# Patient Record
Sex: FEMALE | Race: OTHER | HISPANIC OR LATINO
[De-identification: names, ages, dates, MRNs, and addresses within clinical notes are randomized per-mention and may not be internally consistent; named-entity substitution may affect disease eponyms.]

---

## 2017-01-28 ENCOUNTER — LABORATORY RESULT (OUTPATIENT)
Age: 72
End: 2017-01-28

## 2017-02-24 ENCOUNTER — APPOINTMENT (OUTPATIENT)
Dept: PULMONOLOGY | Facility: CLINIC | Age: 72
End: 2017-02-24

## 2017-02-24 VITALS
WEIGHT: 126 LBS | BODY MASS INDEX: 24.61 KG/M2 | RESPIRATION RATE: 14 BRPM | DIASTOLIC BLOOD PRESSURE: 80 MMHG | SYSTOLIC BLOOD PRESSURE: 134 MMHG | HEART RATE: 82 BPM | OXYGEN SATURATION: 97 %

## 2017-02-24 DIAGNOSIS — R76.11 NONSPECIFIC REACTION TO TUBERCULIN SKIN TEST W/OUT ACTIVE TUBERCULOSIS: ICD-10-CM

## 2018-07-05 ENCOUNTER — APPOINTMENT (OUTPATIENT)
Dept: GASTROENTEROLOGY | Facility: CLINIC | Age: 73
End: 2018-07-05
Payer: MEDICARE

## 2018-07-05 VITALS
BODY MASS INDEX: 24.74 KG/M2 | HEIGHT: 60 IN | WEIGHT: 126 LBS | SYSTOLIC BLOOD PRESSURE: 130 MMHG | HEART RATE: 76 BPM | DIASTOLIC BLOOD PRESSURE: 72 MMHG | OXYGEN SATURATION: 97 %

## 2018-07-05 DIAGNOSIS — R10.13 EPIGASTRIC PAIN: ICD-10-CM

## 2018-07-05 PROCEDURE — 99204 OFFICE O/P NEW MOD 45 MIN: CPT

## 2018-09-25 ENCOUNTER — APPOINTMENT (OUTPATIENT)
Dept: GASTROENTEROLOGY | Facility: GI CENTER | Age: 73
End: 2018-09-25
Payer: MEDICARE

## 2018-09-25 ENCOUNTER — RESULT REVIEW (OUTPATIENT)
Age: 73
End: 2018-09-25

## 2018-09-25 ENCOUNTER — OUTPATIENT (OUTPATIENT)
Dept: OUTPATIENT SERVICES | Facility: HOSPITAL | Age: 73
LOS: 1 days | End: 2018-09-25
Payer: MEDICARE

## 2018-09-25 VITALS
SYSTOLIC BLOOD PRESSURE: 130 MMHG | HEIGHT: 60 IN | TEMPERATURE: 98 F | RESPIRATION RATE: 12 BRPM | HEART RATE: 80 BPM | DIASTOLIC BLOOD PRESSURE: 70 MMHG | WEIGHT: 126 LBS | BODY MASS INDEX: 24.74 KG/M2

## 2018-09-25 DIAGNOSIS — R10.13 EPIGASTRIC PAIN: ICD-10-CM

## 2018-09-25 DIAGNOSIS — K21.9 GASTRO-ESOPHAGEAL REFLUX DISEASE WITHOUT ESOPHAGITIS: ICD-10-CM

## 2018-09-25 LAB — GLUCOSE BLDC GLUCOMTR-MCNC: 156 MG/DL — HIGH (ref 70–99)

## 2018-09-25 PROCEDURE — T1013: CPT

## 2018-09-25 PROCEDURE — 43239 EGD BIOPSY SINGLE/MULTIPLE: CPT

## 2018-09-25 PROCEDURE — 88342 IMHCHEM/IMCYTCHM 1ST ANTB: CPT

## 2018-09-25 PROCEDURE — 88342 IMHCHEM/IMCYTCHM 1ST ANTB: CPT | Mod: 26

## 2018-09-25 PROCEDURE — 82962 GLUCOSE BLOOD TEST: CPT

## 2018-09-25 PROCEDURE — 88305 TISSUE EXAM BY PATHOLOGIST: CPT | Mod: 26

## 2018-09-25 PROCEDURE — 88305 TISSUE EXAM BY PATHOLOGIST: CPT

## 2018-09-28 LAB — SURGICAL PATHOLOGY FINAL REPORT - CH: SIGNIFICANT CHANGE UP

## 2018-10-25 ENCOUNTER — OUTPATIENT (OUTPATIENT)
Dept: OUTPATIENT SERVICES | Facility: HOSPITAL | Age: 73
LOS: 1 days | End: 2018-10-25
Payer: MEDICARE

## 2018-10-25 ENCOUNTER — APPOINTMENT (OUTPATIENT)
Dept: RADIOLOGY | Facility: CLINIC | Age: 73
End: 2018-10-25
Payer: MEDICARE

## 2018-10-25 ENCOUNTER — APPOINTMENT (OUTPATIENT)
Dept: MAMMOGRAPHY | Facility: CLINIC | Age: 73
End: 2018-10-25
Payer: MEDICARE

## 2018-10-25 DIAGNOSIS — Z12.31 ENCOUNTER FOR SCREENING MAMMOGRAM FOR MALIGNANT NEOPLASM OF BREAST: ICD-10-CM

## 2018-10-25 PROCEDURE — 77080 DXA BONE DENSITY AXIAL: CPT

## 2018-10-25 PROCEDURE — 77063 BREAST TOMOSYNTHESIS BI: CPT

## 2018-10-25 PROCEDURE — 77063 BREAST TOMOSYNTHESIS BI: CPT | Mod: 26

## 2018-10-25 PROCEDURE — 77080 DXA BONE DENSITY AXIAL: CPT | Mod: 26

## 2018-10-25 PROCEDURE — 77067 SCR MAMMO BI INCL CAD: CPT | Mod: 26

## 2018-10-25 PROCEDURE — 77067 SCR MAMMO BI INCL CAD: CPT

## 2018-12-06 ENCOUNTER — APPOINTMENT (OUTPATIENT)
Dept: GASTROENTEROLOGY | Facility: CLINIC | Age: 73
End: 2018-12-06
Payer: MEDICARE

## 2018-12-06 VITALS
HEIGHT: 60 IN | DIASTOLIC BLOOD PRESSURE: 81 MMHG | WEIGHT: 126 LBS | HEART RATE: 80 BPM | SYSTOLIC BLOOD PRESSURE: 150 MMHG | BODY MASS INDEX: 24.74 KG/M2

## 2018-12-06 DIAGNOSIS — K21.9 GASTRO-ESOPHAGEAL REFLUX DISEASE W/OUT ESOPHAGITIS: ICD-10-CM

## 2018-12-06 DIAGNOSIS — K29.00 ACUTE GASTRITIS W/OUT BLEEDING: ICD-10-CM

## 2018-12-06 PROCEDURE — 99214 OFFICE O/P EST MOD 30 MIN: CPT

## 2019-12-01 ENCOUNTER — TRANSCRIPTION ENCOUNTER (OUTPATIENT)
Age: 74
End: 2019-12-01

## 2021-10-12 ENCOUNTER — APPOINTMENT (OUTPATIENT)
Dept: GASTROENTEROLOGY | Facility: CLINIC | Age: 76
End: 2021-10-12
Payer: MEDICARE

## 2021-10-12 VITALS
HEIGHT: 60 IN | BODY MASS INDEX: 24.35 KG/M2 | RESPIRATION RATE: 14 BRPM | DIASTOLIC BLOOD PRESSURE: 70 MMHG | TEMPERATURE: 97.5 F | WEIGHT: 124 LBS | OXYGEN SATURATION: 98 % | HEART RATE: 71 BPM | SYSTOLIC BLOOD PRESSURE: 152 MMHG

## 2021-10-12 PROCEDURE — 99214 OFFICE O/P EST MOD 30 MIN: CPT

## 2021-10-12 NOTE — HISTORY OF PRESENT ILLNESS
[None] : had no significant interval events [Heartburn] : stable heartburn [Nausea] : denies nausea [Vomiting] : denies vomiting [Diarrhea] : denies diarrhea [Constipation] : denies constipation [Yellow Skin Or Eyes (Jaundice)] : denies jaundice [Abdominal Pain] : denies abdominal pain [Abdominal Swelling] : denies abdominal swelling [Rectal Pain] : denies rectal pain [GERD] : gastroesophageal reflux disease [Abdominal Surgery] : abdominal surgery [Wt Gain ___ Lbs] : no recent weight gain [Wt Loss ___ Lbs] : no recent weight loss [Hiatus Hernia] : no hiatus hernia [Peptic Ulcer Disease] : no peptic ulcer disease [Pancreatitis] : no pancreatitis [Cholelithiasis] : no cholelithiasis [Kidney Stone] : no kidney stone [Inflammatory Bowel Disease] : no inflammatory bowel disease [Irritable Bowel Syndrome] : no irritable bowel syndrome [Diverticulitis] : no diverticulitis [Alcohol Abuse] : no alcohol abuse [Malignancy] : no malignancy [Appendectomy] : no appendectomy [Cholecystectomy] : no cholecystectomy [de-identified] : 2018 [de-identified] : EGD with biopsy [de-identified] : Patient presents for follow-up evaluation for positive fecal immunochemical test or FIT.  She apparently has not had a colonoscopy for more than 10 years and denies gross hematochezia or melena or abdominal pain or unexplained weight loss or anorexia.  She was last seen in September 2018 at which time she had an upper endoscopy with biopsy which showed mild H. pylori negative gastritis but was otherwise unremarkable and she presently has no upper GI symptoms or complaints.  Her chronic GERD is managed with dietary modifications.

## 2021-10-12 NOTE — PHYSICAL EXAM
[General Appearance - Alert] : alert [General Appearance - In No Acute Distress] : in no acute distress [General Appearance - Well Nourished] : well nourished [General Appearance - Well-Appearing] : healthy appearing [General Appearance - Well Developed] : well developed [Sclera] : the sclera and conjunctiva were normal [PERRL With Normal Accommodation] : pupils were equal in size, round, and reactive to light [Extraocular Movements] : extraocular movements were intact [Outer Ear] : the ears and nose were normal in appearance [Examination Of The Oral Cavity] : the lips and gums were normal [Oropharynx] : the oropharynx was normal [Neck Appearance] : the appearance of the neck was normal [Neck Cervical Mass (___cm)] : no neck mass was observed [Jugular Venous Distention Increased] : there was no jugular-venous distention [Thyroid Diffuse Enlargement] : the thyroid was not enlarged [Thyroid Nodule] : there were no palpable thyroid nodules [Auscultation Breath Sounds / Voice Sounds] : lungs were clear to auscultation bilaterally [Heart Rate And Rhythm] : heart rate was normal and rhythm regular [Heart Sounds] : normal S1 and S2 [Heart Sounds Gallop] : no gallops [Murmurs] : no murmurs [Heart Sounds Pericardial Friction Rub] : no pericardial rub [Bowel Sounds] : normal bowel sounds [] : no hepato-splenomegaly [Abdomen Soft] : soft [Abdomen Mass (___ Cm)] : no abdominal mass palpated [Epigastric] : in the epigastric area [Patient Refused] : rectal exam was refused by the patient [No CVA Tenderness] : no ~M costovertebral angle tenderness [Abnormal Walk] : normal gait [Musculoskeletal - Swelling] : no joint swelling seen [Skin Color & Pigmentation] : normal skin color and pigmentation [Skin Turgor] : normal skin turgor [Oriented To Time, Place, And Person] : oriented to person, place, and time [Periumbilical] : not in the periumbilical area [Suprapubic] : not in the suprapubic area [RUQ] : not in the in the right upper quadrant [RLQ] : not in the right lower quadrant [LUQ] : not in the left upper quadrant [LLQ] : not in the left lower quadrant

## 2021-10-12 NOTE — ASSESSMENT
[FreeTextEntry1] : Impression: Positive fecal immunochemical test or FIT rule out colorectal neoplasm.\par \par Recommendations: High risk screening colonoscopy was advised for further evaluation of the above.  The risk versus benefits of high risk screening colonoscopy and intravenous sedation, and alternative testing such as virtual colonoscopy, were individually explained to the patient and her daughter today who accompanied the patient and acted as an  for both of whom appeared to understand all of the above and were agreeable to proceeding with screening colonoscopy.  Her ASA classification is 2.  She will be prepped with MiraLAX and Dulcolax tablets and is medically optimized for the proposed colonoscopy and appeared to understand all of the above instructions, information, and management plan.

## 2021-12-25 ENCOUNTER — APPOINTMENT (OUTPATIENT)
Dept: DISASTER EMERGENCY | Facility: CLINIC | Age: 76
End: 2021-12-25

## 2021-12-27 ENCOUNTER — TRANSCRIPTION ENCOUNTER (OUTPATIENT)
Age: 76
End: 2021-12-27

## 2021-12-28 ENCOUNTER — RESULT REVIEW (OUTPATIENT)
Age: 76
End: 2021-12-28

## 2021-12-28 ENCOUNTER — OUTPATIENT (OUTPATIENT)
Dept: OUTPATIENT SERVICES | Facility: HOSPITAL | Age: 76
LOS: 1 days | End: 2021-12-28
Payer: MEDICARE

## 2021-12-28 ENCOUNTER — APPOINTMENT (OUTPATIENT)
Dept: GASTROENTEROLOGY | Facility: GI CENTER | Age: 76
End: 2021-12-28
Payer: MEDICARE

## 2021-12-28 DIAGNOSIS — R19.5 OTHER FECAL ABNORMALITIES: ICD-10-CM

## 2021-12-28 DIAGNOSIS — K57.30 DIVERTICULOSIS OF LARGE INTESTINE W/OUT PERFORATION OR ABSCESS W/OUT BLEEDING: ICD-10-CM

## 2021-12-28 DIAGNOSIS — D12.3 BENIGN NEOPLASM OF TRANSVERSE COLON: ICD-10-CM

## 2021-12-28 DIAGNOSIS — K64.8 OTHER HEMORRHOIDS: ICD-10-CM

## 2021-12-28 LAB — GLUCOSE BLDC GLUCOMTR-MCNC: 162 MG/DL — HIGH (ref 70–99)

## 2021-12-28 PROCEDURE — 88305 TISSUE EXAM BY PATHOLOGIST: CPT | Mod: 26

## 2021-12-28 PROCEDURE — 82962 GLUCOSE BLOOD TEST: CPT

## 2021-12-28 PROCEDURE — 45385 COLONOSCOPY W/LESION REMOVAL: CPT

## 2021-12-28 PROCEDURE — 45380 COLONOSCOPY AND BIOPSY: CPT

## 2021-12-28 PROCEDURE — 88305 TISSUE EXAM BY PATHOLOGIST: CPT

## 2021-12-31 LAB — SURGICAL PATHOLOGY STUDY: SIGNIFICANT CHANGE UP

## 2022-11-08 ENCOUNTER — OFFICE (OUTPATIENT)
Dept: URBAN - METROPOLITAN AREA CLINIC 94 | Facility: CLINIC | Age: 77
Setting detail: OPHTHALMOLOGY
End: 2022-11-08
Payer: MEDICAID

## 2022-11-08 DIAGNOSIS — E11.3291: ICD-10-CM

## 2022-11-08 DIAGNOSIS — E11.3293: ICD-10-CM

## 2022-11-08 DIAGNOSIS — H35.3111: ICD-10-CM

## 2022-11-08 DIAGNOSIS — H16.223: ICD-10-CM

## 2022-11-08 DIAGNOSIS — E11.3292: ICD-10-CM

## 2022-11-08 DIAGNOSIS — H40.1131: ICD-10-CM

## 2022-11-08 PROCEDURE — 92133 CPTRZD OPH DX IMG PST SGM ON: CPT | Performed by: OPHTHALMOLOGY

## 2022-11-08 PROCEDURE — 99213 OFFICE O/P EST LOW 20 MIN: CPT | Performed by: OPHTHALMOLOGY

## 2022-11-08 ASSESSMENT — REFRACTION_CURRENTRX
OD_CYLINDER: -0.50
OS_ADD: +2.75
OD_ADD: +2.75
OS_OVR_VA: 20/
OD_VPRISM_DIRECTION: PROGS
OS_SPHERE: -0.25
OS_AXIS: 067
OD_OVR_VA: 20/
OS_CYLINDER: -1.25
OD_SPHERE: -0.25
OD_AXIS: 098
OS_VPRISM_DIRECTION: PROGS

## 2022-11-08 ASSESSMENT — VISUAL ACUITY
OD_BCVA: 20/25
OS_BCVA: 20/30

## 2022-11-08 ASSESSMENT — REFRACTION_AUTOREFRACTION
OS_AXIS: 069
OS_CYLINDER: -2.25
OS_SPHERE: +1.00
OD_SPHERE: +1.00
OD_AXIS: 094
OD_CYLINDER: -1.50

## 2022-11-08 ASSESSMENT — KERATOMETRY
OS_AXISANGLE_DEGREES: 155
METHOD_AUTO_MANUAL: AUTO
OS_K2POWER_DIOPTERS: 45.25
OD_AXISANGLE_DEGREES: 010
OD_K1POWER_DIOPTERS: 44.00
OS_K1POWER_DIOPTERS: 43.50
OD_K2POWER_DIOPTERS: 44.25

## 2022-11-08 ASSESSMENT — PACHYMETRY
OD_CT_UM: 512
OS_CT_UM: 520
OS_CT_CORRECTION: 1
OD_CT_CORRECTION: 2

## 2022-11-08 ASSESSMENT — AXIALLENGTH_DERIVED
OD_AL: 23.2694
OS_AL: 23.3223

## 2022-11-08 ASSESSMENT — TONOMETRY
OS_IOP_MMHG: 18
OD_IOP_MMHG: 15
OS_IOP_MMHG: 15
OD_IOP_MMHG: 18

## 2022-11-08 ASSESSMENT — CONFRONTATIONAL VISUAL FIELD TEST (CVF)
OD_FINDINGS: FULL
OS_FINDINGS: FULL

## 2022-11-08 ASSESSMENT — LID EXAM ASSESSMENTS: OS_TRICHIASIS: LLL 1+

## 2022-11-08 ASSESSMENT — SPHEQUIV_DERIVED
OS_SPHEQUIV: -0.125
OD_SPHEQUIV: 0.25

## 2022-12-09 ENCOUNTER — APPOINTMENT (OUTPATIENT)
Dept: GASTROENTEROLOGY | Facility: CLINIC | Age: 77
End: 2022-12-09

## 2023-02-06 ENCOUNTER — OFFICE (OUTPATIENT)
Dept: URBAN - METROPOLITAN AREA CLINIC 94 | Facility: CLINIC | Age: 78
Setting detail: OPHTHALMOLOGY
End: 2023-02-06
Payer: MEDICAID

## 2023-02-06 DIAGNOSIS — H40.1131: ICD-10-CM

## 2023-02-06 DIAGNOSIS — E11.3291: ICD-10-CM

## 2023-02-06 DIAGNOSIS — E11.3293: ICD-10-CM

## 2023-02-06 DIAGNOSIS — H35.3111: ICD-10-CM

## 2023-02-06 DIAGNOSIS — H16.223: ICD-10-CM

## 2023-02-06 DIAGNOSIS — E11.3292: ICD-10-CM

## 2023-02-06 PROCEDURE — 99213 OFFICE O/P EST LOW 20 MIN: CPT | Performed by: OPHTHALMOLOGY

## 2023-02-06 PROCEDURE — 92083 EXTENDED VISUAL FIELD XM: CPT | Performed by: OPHTHALMOLOGY

## 2023-02-06 ASSESSMENT — KERATOMETRY
OD_K1POWER_DIOPTERS: 43.50
OD_K2POWER_DIOPTERS: 44.25
OS_K1POWER_DIOPTERS: 43.75
OS_K2POWER_DIOPTERS: 45.25
OD_AXISANGLE_DEGREES: 180
OS_AXISANGLE_DEGREES: 151
METHOD_AUTO_MANUAL: AUTO

## 2023-02-06 ASSESSMENT — REFRACTION_CURRENTRX
OD_OVR_VA: 20/
OD_CYLINDER: -0.50
OS_SPHERE: -0.25
OS_AXIS: 067
OS_VPRISM_DIRECTION: PROGS
OD_SPHERE: -0.25
OS_CYLINDER: -1.25
OS_OVR_VA: 20/
OD_VPRISM_DIRECTION: PROGS
OD_AXIS: 098
OS_ADD: +2.75
OD_ADD: +2.75

## 2023-02-06 ASSESSMENT — REFRACTION_AUTOREFRACTION
OS_SPHERE: +1.00
OS_CYLINDER: -2.50
OD_CYLINDER: -1.50
OS_AXIS: 069
OD_SPHERE: +1.00
OD_AXIS: 091

## 2023-02-06 ASSESSMENT — TONOMETRY
OD_IOP_MMHG: 18
OS_IOP_MMHG: 17
OD_IOP_MMHG: 18
OS_IOP_MMHG: 18

## 2023-02-06 ASSESSMENT — VISUAL ACUITY
OD_BCVA: 20/30
OS_BCVA: 20/25

## 2023-02-06 ASSESSMENT — SPHEQUIV_DERIVED
OD_SPHEQUIV: 0.25
OS_SPHEQUIV: -0.25

## 2023-02-06 ASSESSMENT — PACHYMETRY
OS_CT_UM: 520
OD_CT_CORRECTION: 2
OS_CT_CORRECTION: 1
OD_CT_UM: 512

## 2023-02-06 ASSESSMENT — LID EXAM ASSESSMENTS: OS_TRICHIASIS: LLL 1+

## 2023-02-06 ASSESSMENT — AXIALLENGTH_DERIVED
OS_AL: 23.325
OD_AL: 23.3591

## 2023-03-06 ENCOUNTER — OFFICE (OUTPATIENT)
Dept: URBAN - METROPOLITAN AREA CLINIC 94 | Facility: CLINIC | Age: 78
Setting detail: OPHTHALMOLOGY
End: 2023-03-06
Payer: MEDICAID

## 2023-03-06 DIAGNOSIS — E11.3293: ICD-10-CM

## 2023-03-06 DIAGNOSIS — H35.3111: ICD-10-CM

## 2023-03-06 PROCEDURE — 92235 FLUORESCEIN ANGRPH MLTIFRAME: CPT | Performed by: SPECIALIST

## 2023-03-06 PROCEDURE — 92012 INTRM OPH EXAM EST PATIENT: CPT | Performed by: SPECIALIST

## 2023-03-06 PROCEDURE — 92134 CPTRZ OPH DX IMG PST SGM RTA: CPT | Performed by: SPECIALIST

## 2023-03-06 ASSESSMENT — LID EXAM ASSESSMENTS: OS_TRICHIASIS: LLL 1+

## 2023-03-06 ASSESSMENT — SPHEQUIV_DERIVED
OS_SPHEQUIV: -0.25
OD_SPHEQUIV: 0.25

## 2023-03-06 ASSESSMENT — KERATOMETRY
METHOD_AUTO_MANUAL: AUTO
OS_AXISANGLE_DEGREES: 151
OD_K1POWER_DIOPTERS: 43.50
OD_AXISANGLE_DEGREES: 180
OS_K2POWER_DIOPTERS: 45.25
OD_K2POWER_DIOPTERS: 44.25
OS_K1POWER_DIOPTERS: 43.75

## 2023-03-06 ASSESSMENT — REFRACTION_AUTOREFRACTION
OS_AXIS: 069
OD_CYLINDER: -1.50
OS_CYLINDER: -2.50
OD_SPHERE: +1.00
OS_SPHERE: +1.00
OD_AXIS: 091

## 2023-03-06 ASSESSMENT — VISUAL ACUITY
OD_BCVA: 20/30
OS_BCVA: 20/25

## 2023-03-06 ASSESSMENT — AXIALLENGTH_DERIVED
OD_AL: 23.3591
OS_AL: 23.325

## 2023-05-28 ENCOUNTER — EMERGENCY (EMERGENCY)
Facility: HOSPITAL | Age: 78
LOS: 1 days | Discharge: HOME | End: 2023-05-28
Attending: EMERGENCY MEDICINE
Payer: MEDICARE

## 2023-05-28 VITALS
RESPIRATION RATE: 16 BRPM | HEART RATE: 71 BPM | OXYGEN SATURATION: 98 % | DIASTOLIC BLOOD PRESSURE: 63 MMHG | TEMPERATURE: 98 F | SYSTOLIC BLOOD PRESSURE: 106 MMHG

## 2023-05-28 VITALS
HEART RATE: 90 BPM | RESPIRATION RATE: 18 BRPM | TEMPERATURE: 98 F | OXYGEN SATURATION: 97 % | SYSTOLIC BLOOD PRESSURE: 133 MMHG | HEIGHT: 60 IN | DIASTOLIC BLOOD PRESSURE: 71 MMHG | WEIGHT: 126.1 LBS

## 2023-05-28 LAB
ALBUMIN SERPL ELPH-MCNC: 4.1 G/DL — SIGNIFICANT CHANGE UP (ref 3.3–5.2)
ALP SERPL-CCNC: 81 U/L — SIGNIFICANT CHANGE UP (ref 40–120)
ALT FLD-CCNC: 10 U/L — SIGNIFICANT CHANGE UP
ANION GAP SERPL CALC-SCNC: 13 MMOL/L — SIGNIFICANT CHANGE UP (ref 5–17)
AST SERPL-CCNC: 14 U/L — SIGNIFICANT CHANGE UP
BASOPHILS # BLD AUTO: 0.03 K/UL — SIGNIFICANT CHANGE UP (ref 0–0.2)
BASOPHILS NFR BLD AUTO: 0.2 % — SIGNIFICANT CHANGE UP (ref 0–2)
BILIRUB SERPL-MCNC: 0.4 MG/DL — SIGNIFICANT CHANGE UP (ref 0.4–2)
BUN SERPL-MCNC: 15.3 MG/DL — SIGNIFICANT CHANGE UP (ref 8–20)
CALCIUM SERPL-MCNC: 9.2 MG/DL — SIGNIFICANT CHANGE UP (ref 8.4–10.5)
CHLORIDE SERPL-SCNC: 99 MMOL/L — SIGNIFICANT CHANGE UP (ref 96–108)
CO2 SERPL-SCNC: 23 MMOL/L — SIGNIFICANT CHANGE UP (ref 22–29)
CREAT SERPL-MCNC: 0.56 MG/DL — SIGNIFICANT CHANGE UP (ref 0.5–1.3)
EGFR: 95 ML/MIN/1.73M2 — SIGNIFICANT CHANGE UP
EOSINOPHIL # BLD AUTO: 0.03 K/UL — SIGNIFICANT CHANGE UP (ref 0–0.5)
EOSINOPHIL NFR BLD AUTO: 0.2 % — SIGNIFICANT CHANGE UP (ref 0–6)
GLUCOSE SERPL-MCNC: 151 MG/DL — HIGH (ref 70–99)
HCT VFR BLD CALC: 36.1 % — SIGNIFICANT CHANGE UP (ref 34.5–45)
HGB BLD-MCNC: 11.6 G/DL — SIGNIFICANT CHANGE UP (ref 11.5–15.5)
IMM GRANULOCYTES NFR BLD AUTO: 0.3 % — SIGNIFICANT CHANGE UP (ref 0–0.9)
LIDOCAIN IGE QN: 21 U/L — LOW (ref 22–51)
LYMPHOCYTES # BLD AUTO: 1.22 K/UL — SIGNIFICANT CHANGE UP (ref 1–3.3)
LYMPHOCYTES # BLD AUTO: 10.1 % — LOW (ref 13–44)
MAGNESIUM SERPL-MCNC: 1.7 MG/DL — LOW (ref 1.8–2.6)
MCHC RBC-ENTMCNC: 27.1 PG — SIGNIFICANT CHANGE UP (ref 27–34)
MCHC RBC-ENTMCNC: 32.1 GM/DL — SIGNIFICANT CHANGE UP (ref 32–36)
MCV RBC AUTO: 84.3 FL — SIGNIFICANT CHANGE UP (ref 80–100)
MONOCYTES # BLD AUTO: 0.9 K/UL — SIGNIFICANT CHANGE UP (ref 0–0.9)
MONOCYTES NFR BLD AUTO: 7.5 % — SIGNIFICANT CHANGE UP (ref 2–14)
NEUTROPHILS # BLD AUTO: 9.86 K/UL — HIGH (ref 1.8–7.4)
NEUTROPHILS NFR BLD AUTO: 81.7 % — HIGH (ref 43–77)
NT-PROBNP SERPL-SCNC: 272 PG/ML — SIGNIFICANT CHANGE UP (ref 0–300)
PLATELET # BLD AUTO: 191 K/UL — SIGNIFICANT CHANGE UP (ref 150–400)
POTASSIUM SERPL-MCNC: 4.1 MMOL/L — SIGNIFICANT CHANGE UP (ref 3.5–5.3)
POTASSIUM SERPL-SCNC: 4.1 MMOL/L — SIGNIFICANT CHANGE UP (ref 3.5–5.3)
PROT SERPL-MCNC: 7.8 G/DL — SIGNIFICANT CHANGE UP (ref 6.6–8.7)
RAPID RVP RESULT: SIGNIFICANT CHANGE UP
RBC # BLD: 4.28 M/UL — SIGNIFICANT CHANGE UP (ref 3.8–5.2)
RBC # FLD: 12.9 % — SIGNIFICANT CHANGE UP (ref 10.3–14.5)
SARS-COV-2 RNA SPEC QL NAA+PROBE: SIGNIFICANT CHANGE UP
SODIUM SERPL-SCNC: 135 MMOL/L — SIGNIFICANT CHANGE UP (ref 135–145)
TROPONIN T SERPL-MCNC: <0.01 NG/ML — SIGNIFICANT CHANGE UP (ref 0–0.06)
TROPONIN T SERPL-MCNC: <0.01 NG/ML — SIGNIFICANT CHANGE UP (ref 0–0.06)
WBC # BLD: 12.08 K/UL — HIGH (ref 3.8–10.5)
WBC # FLD AUTO: 12.08 K/UL — HIGH (ref 3.8–10.5)

## 2023-05-28 PROCEDURE — 93010 ELECTROCARDIOGRAM REPORT: CPT | Mod: 77

## 2023-05-28 PROCEDURE — 83735 ASSAY OF MAGNESIUM: CPT

## 2023-05-28 PROCEDURE — 99285 EMERGENCY DEPT VISIT HI MDM: CPT | Mod: 25

## 2023-05-28 PROCEDURE — 71045 X-RAY EXAM CHEST 1 VIEW: CPT

## 2023-05-28 PROCEDURE — 84484 ASSAY OF TROPONIN QUANT: CPT

## 2023-05-28 PROCEDURE — 93010 ELECTROCARDIOGRAM REPORT: CPT

## 2023-05-28 PROCEDURE — 71045 X-RAY EXAM CHEST 1 VIEW: CPT | Mod: 26

## 2023-05-28 PROCEDURE — 99285 EMERGENCY DEPT VISIT HI MDM: CPT

## 2023-05-28 PROCEDURE — T1013: CPT

## 2023-05-28 PROCEDURE — 83690 ASSAY OF LIPASE: CPT

## 2023-05-28 PROCEDURE — 80053 COMPREHEN METABOLIC PANEL: CPT

## 2023-05-28 PROCEDURE — 93005 ELECTROCARDIOGRAM TRACING: CPT

## 2023-05-28 PROCEDURE — 83880 ASSAY OF NATRIURETIC PEPTIDE: CPT

## 2023-05-28 PROCEDURE — 85025 COMPLETE CBC W/AUTO DIFF WBC: CPT

## 2023-05-28 PROCEDURE — 96374 THER/PROPH/DIAG INJ IV PUSH: CPT

## 2023-05-28 PROCEDURE — 36415 COLL VENOUS BLD VENIPUNCTURE: CPT

## 2023-05-28 PROCEDURE — 0225U NFCT DS DNA&RNA 21 SARSCOV2: CPT

## 2023-05-28 RX ORDER — KETOROLAC TROMETHAMINE 30 MG/ML
15 SYRINGE (ML) INJECTION ONCE
Refills: 0 | Status: DISCONTINUED | OUTPATIENT
Start: 2023-05-28 | End: 2023-05-28

## 2023-05-28 RX ADMIN — Medication 15 MILLIGRAM(S): at 10:16

## 2023-05-28 RX ADMIN — Medication 15 MILLIGRAM(S): at 09:12

## 2023-05-28 NOTE — ED PROVIDER NOTE - PHYSICAL EXAMINATION
VITAL SIGNS: I have reviewed vital signs  CONSTITUTIONAL:  in no acute distress.  SKIN: Warm, dry, no rash.  HEAD: Normocephalic, atraumatic.  EYES: PERRL, conjunctiva and sclera clear.  ENT: pink & moist mucosa, no pharyngeal erythema  NECK: Supple, non tender. No cervical lymphadenopathy.  CARD: Regular rate and rhythm. No murmurs.   RESP: No wheezes, rales or rhonchi.   ABD:  soft, non-distended, non-tender.   MSK:  Good ROM in upper/lower extremities w/o pain.   NEURO: Alert, oriented. Grossly unremarkable. No focal deficits.

## 2023-05-28 NOTE — ED PROVIDER NOTE - OBJECTIVE STATEMENT
LUIS SILVA is a 77yo Female with PMH HTN, HLD, DM on orlas who presents c/o left sided chest pain radiating to the left arm and left shoulder that came on at 10pm last night while she was sleeping. Pt states the pain is a/w cough, congestion, abdominal pain and diarrhea which all started yesterday. PT denies any symptoms of fevers, chills, nausea, vomiting, urinary symptoms, weakness, confusion. LUIS SILVA is a 77yo Female with PMH HTN, HLD, DM on orals who presents c/o left sided chest pain radiating to the left arm and left shoulder that came on at 10pm last night while she was sleeping. Pt states the pain is a/w cough, congestion, abdominal pain and diarrhea which all started yesterday. PT denies any symptoms of fevers, chills, nausea, vomiting, urinary symptoms, weakness, confusion.

## 2023-05-28 NOTE — ED PROVIDER NOTE - CARE PROVIDER_API CALL
Santosh Gannon  Cardiology  [address] [facility], [address] [place] [id number]  Phone: [phone]  Fax: [phone]  Follow Up Time: 1-3 Days

## 2023-05-28 NOTE — ED ADULT NURSE REASSESSMENT NOTE - NS ED NURSE REASSESS COMMENT FT1
Pt updated on the plan of care, pt educated about tests performed, to be performed, pt states understanding, VS recorded and placed in the EMR. Pt not in any pain or distress at this time, pt education deemed successful after teach back shows proficiency.

## 2023-05-28 NOTE — ED PROVIDER NOTE - CLINICAL SUMMARY MEDICAL DECISION MAKING FREE TEXT BOX
ASSESSMENT:   LUIS SILVA is a 77yo F who presented with CHEST PAIN, cough, diarrhea, abd pain x 1 days. Vitals stable. Physical exam non contributory. EKG w/o acute change.     PLAN: Treat pain. Screen for acs w/ troponin. Screen for infection w/ viral swab, cbc. Check electrolytes given diarrhea.

## 2023-05-28 NOTE — ED PROVIDER NOTE - PATIENT PORTAL LINK FT
You can access the FollowMyHealth Patient Portal offered by [facility] by registering at the following website: [email or web address]. By joining [facility]’s FollowMyHealth portal, you will also be able to view your health information using other applications (apps) compatible with our system.

## 2023-05-28 NOTE — ED PROVIDER NOTE - NS ED ROS FT
Review of Systems  CONSTITUTIONAL: afebrile w/no diaphoresis or weight changes  SKIN: warm, dry w/ no rash or bleeding  EYES: no changes to vision  ENT: no changes in hearing, no sore throat  RESPIRATORY: no SOB +COUGH  CARDIAC: +CHEST PAIN  GI: no vomiting, diarrhea, constipation, or blood in stool/krys blood +ABDOMINAL PAIN  GENITO-URINARY: no discharge, dysuria or hematuria,   MUSCULOSKELETAL:  no joint pain, swelling or redness  NEUROLOGIC: no weakness, headache, anesthesia or paresthesias  PSYCH: no anxiety, non suicidal, non homicidal, without hallucinations or depression

## 2023-05-28 NOTE — ED ADULT NURSE NOTE - NSFALLHARMRISKINTERV_ED_ALL_ED

## 2023-05-30 RX ORDER — AZITHROMYCIN 500 MG/1
1 TABLET, FILM COATED ORAL
Qty: 1 | Refills: 0
Start: 2023-05-30 | End: 2023-06-03

## 2023-06-08 ENCOUNTER — OFFICE (OUTPATIENT)
Dept: URBAN - METROPOLITAN AREA CLINIC 94 | Facility: CLINIC | Age: 78
Setting detail: OPHTHALMOLOGY
End: 2023-06-08
Payer: MEDICAID

## 2023-06-08 DIAGNOSIS — H35.3111: ICD-10-CM

## 2023-06-08 DIAGNOSIS — E11.3291: ICD-10-CM

## 2023-06-08 DIAGNOSIS — Z96.1: ICD-10-CM

## 2023-06-08 DIAGNOSIS — H16.223: ICD-10-CM

## 2023-06-08 DIAGNOSIS — E11.3292: ICD-10-CM

## 2023-06-08 DIAGNOSIS — E11.3293: ICD-10-CM

## 2023-06-08 DIAGNOSIS — H40.1131: ICD-10-CM

## 2023-06-08 PROCEDURE — 92250 FUNDUS PHOTOGRAPHY W/I&R: CPT | Performed by: OPHTHALMOLOGY

## 2023-06-08 PROCEDURE — 99213 OFFICE O/P EST LOW 20 MIN: CPT | Performed by: OPHTHALMOLOGY

## 2023-06-08 PROCEDURE — 92020 GONIOSCOPY: CPT | Performed by: OPHTHALMOLOGY

## 2023-06-08 ASSESSMENT — PACHYMETRY
OD_CT_UM: 512
OS_CT_UM: 520
OD_CT_CORRECTION: 2
OS_CT_CORRECTION: 1

## 2023-06-08 ASSESSMENT — TONOMETRY
OD_IOP_MMHG: 18
OD_IOP_MMHG: 20
OS_IOP_MMHG: 18
OS_IOP_MMHG: 17

## 2023-06-08 ASSESSMENT — REFRACTION_AUTOREFRACTION
OS_CYLINDER: -2.00
OD_AXIS: 092
OS_SPHERE: +0.75
OD_SPHERE: +0.75
OS_AXIS: 071
OD_CYLINDER: -1.25

## 2023-06-08 ASSESSMENT — KERATOMETRY
OD_K1POWER_DIOPTERS: 43.50
OS_AXISANGLE_DEGREES: 157
OS_K1POWER_DIOPTERS: 43.75
OS_K2POWER_DIOPTERS: 45.00
OD_AXISANGLE_DEGREES: 005
OD_K2POWER_DIOPTERS: 44.50
METHOD_AUTO_MANUAL: AUTO

## 2023-06-08 ASSESSMENT — SPHEQUIV_DERIVED
OS_SPHEQUIV: -0.25
OD_SPHEQUIV: 0.125

## 2023-06-08 ASSESSMENT — VISUAL ACUITY
OS_BCVA: 20/30-1
OD_BCVA: 20/60-1

## 2023-06-08 ASSESSMENT — CONFRONTATIONAL VISUAL FIELD TEST (CVF)
OD_FINDINGS: FULL
OS_FINDINGS: FULL

## 2023-06-08 ASSESSMENT — AXIALLENGTH_DERIVED
OD_AL: 23.3618
OS_AL: 23.37

## 2023-06-08 ASSESSMENT — LID EXAM ASSESSMENTS: OS_TRICHIASIS: LLL 1+

## 2023-06-09 ENCOUNTER — APPOINTMENT (OUTPATIENT)
Dept: CARDIOLOGY | Facility: CLINIC | Age: 78
End: 2023-06-09
Payer: MEDICARE

## 2023-06-09 ENCOUNTER — NON-APPOINTMENT (OUTPATIENT)
Age: 78
End: 2023-06-09

## 2023-06-09 VITALS
RESPIRATION RATE: 16 BRPM | SYSTOLIC BLOOD PRESSURE: 110 MMHG | DIASTOLIC BLOOD PRESSURE: 66 MMHG | HEART RATE: 73 BPM | HEIGHT: 60 IN | OXYGEN SATURATION: 97 % | BODY MASS INDEX: 22.97 KG/M2 | TEMPERATURE: 97.6 F | WEIGHT: 117 LBS

## 2023-06-09 DIAGNOSIS — Z78.9 OTHER SPECIFIED HEALTH STATUS: ICD-10-CM

## 2023-06-09 DIAGNOSIS — R07.89 OTHER CHEST PAIN: ICD-10-CM

## 2023-06-09 PROCEDURE — 99204 OFFICE O/P NEW MOD 45 MIN: CPT | Mod: 25

## 2023-06-09 PROCEDURE — 93000 ELECTROCARDIOGRAM COMPLETE: CPT

## 2023-06-09 RX ORDER — FAMOTIDINE 20 MG/1
20 TABLET, FILM COATED ORAL
Refills: 0 | Status: ACTIVE | COMMUNITY
Start: 2023-06-09

## 2023-06-09 RX ORDER — OMEPRAZOLE 20 MG/1
20 CAPSULE, DELAYED RELEASE ORAL
Refills: 0 | Status: DISCONTINUED | COMMUNITY
End: 2023-06-09

## 2023-06-09 RX ORDER — OMEPRAZOLE 40 MG/1
40 CAPSULE, DELAYED RELEASE ORAL
Qty: 30 | Refills: 5 | Status: DISCONTINUED | COMMUNITY
Start: 2018-07-05 | End: 2023-06-09

## 2023-06-09 RX ORDER — GABAPENTIN 100 MG/1
100 CAPSULE ORAL
Refills: 0 | Status: ACTIVE | COMMUNITY
Start: 2023-06-09

## 2023-06-09 RX ORDER — DORZOLAMIDE HYDROCHLORIDE AND TIMOLOL MALEATE PRESERVATIVE FREE 20; 5 MG/ML; MG/ML
2-0.5 SOLUTION/ DROPS OPHTHALMIC TWICE DAILY
Refills: 0 | Status: ACTIVE | COMMUNITY
Start: 2023-06-09

## 2023-06-09 NOTE — ASSESSMENT
[FreeTextEntry1] : EKG: June 9, 2023: Normal sinus rhythm, RSR in V1.\par \par Assessment:\par 1.  Chest pressure\par 2.  Diabetes mellitus\par 3.  Hypertension\par 4.  Hyperlipidemia\par \par Recommendations:\par 1.  Echocardiogram and exercise nuclear stress test\par 2.  Follow-up in 2 months

## 2023-06-09 NOTE — PHYSICAL EXAM
[Well Nourished] : well nourished [No Acute Distress] : no acute distress [Frail] : frail [Normal Conjunctiva] : normal conjunctiva [Normal Venous Pressure] : normal venous pressure [No Carotid Bruit] : no carotid bruit [Normal S1, S2] : normal S1, S2 [No Murmur] : no murmur [No Rub] : no rub [No Gallop] : no gallop [Clear Lung Fields] : clear lung fields [Good Air Entry] : good air entry [No Respiratory Distress] : no respiratory distress  [Soft] : abdomen soft [Non Tender] : non-tender [No Masses/organomegaly] : no masses/organomegaly [Normal Bowel Sounds] : normal bowel sounds [Normal Gait] : normal gait [No Edema] : no edema [No Cyanosis] : no cyanosis [No Clubbing] : no clubbing [No Varicosities] : no varicosities [No Rash] : no rash [No Skin Lesions] : no skin lesions [Moves all extremities] : moves all extremities [No Focal Deficits] : no focal deficits [Normal Speech] : normal speech [Alert and Oriented] : alert and oriented [Normal memory] : normal memory [de-identified] : Chaperone: Leslee Isaac

## 2023-06-09 NOTE — HISTORY OF PRESENT ILLNESS
[FreeTextEntry1] : \par \par \par \par : Patient's daughter Elza in the room with the patient\par \par \par \par HPI: Patient is a 78-year-old Montenegrin-speaking  female with a past medical history of hypertension, hyperlipidemia, diabetes mellitus presents for cardiac evaluation because of chest pain.  Patient was seen at [facility] ER, she had negative troponins.  Work-up in the ER was negative.  Since her discharge from the ER patient has not had any recurrent chest tightness or any palpitations.  Patient denies orthopnea, PND or leg edema\par \par \par \par \par PMH: Hypertension, diabetes, hyperlipidemia.  No prior CAD or CHF\par \par Social History: Non-smoker denies any alcohol or substance abuse\par \par Family history: Noncontributory\par \par \par \par LUIS SILVA is a 79yo Female with PMH HTN, HLD, DM on orals who presents c/o left sided chest pain radiating to the left arm and left shoulder that came on at 10pm last night while she was sleeping. Pt states the pain is a/w cough, congestion, abdominal pain and diarrhea which all started yesterday. PT denies any symptoms of fevers, chills, nausea, vomiting

## 2023-07-07 ENCOUNTER — APPOINTMENT (OUTPATIENT)
Dept: CARDIOLOGY | Facility: CLINIC | Age: 78
End: 2023-07-07
Payer: MEDICARE

## 2023-07-07 PROCEDURE — 93015 CV STRESS TEST SUPVJ I&R: CPT

## 2023-07-07 PROCEDURE — A9500: CPT

## 2023-07-07 PROCEDURE — 78452 HT MUSCLE IMAGE SPECT MULT: CPT

## 2023-07-10 ENCOUNTER — TRANSCRIPTION ENCOUNTER (OUTPATIENT)
Age: 78
End: 2023-07-10

## 2023-07-28 ENCOUNTER — APPOINTMENT (OUTPATIENT)
Dept: CARDIOLOGY | Facility: CLINIC | Age: 78
End: 2023-07-28
Payer: MEDICARE

## 2023-07-28 PROCEDURE — 93306 TTE W/DOPPLER COMPLETE: CPT

## 2023-08-28 ENCOUNTER — APPOINTMENT (OUTPATIENT)
Dept: CARDIOLOGY | Facility: CLINIC | Age: 78
End: 2023-08-28
Payer: MEDICARE

## 2023-08-28 VITALS
HEIGHT: 60 IN | TEMPERATURE: 97.5 F | OXYGEN SATURATION: 95 % | BODY MASS INDEX: 22.78 KG/M2 | DIASTOLIC BLOOD PRESSURE: 73 MMHG | WEIGHT: 116 LBS | HEART RATE: 67 BPM | SYSTOLIC BLOOD PRESSURE: 124 MMHG

## 2023-08-28 DIAGNOSIS — I34.0 NONRHEUMATIC MITRAL (VALVE) INSUFFICIENCY: ICD-10-CM

## 2023-08-28 PROCEDURE — 99213 OFFICE O/P EST LOW 20 MIN: CPT

## 2023-08-28 NOTE — PHYSICAL EXAM
[Well Nourished] : well nourished [No Acute Distress] : no acute distress [Frail] : frail [Normal Conjunctiva] : normal conjunctiva [Normal Venous Pressure] : normal venous pressure [No Carotid Bruit] : no carotid bruit [Normal S1, S2] : normal S1, S2 [No Murmur] : no murmur [No Rub] : no rub [No Gallop] : no gallop [Clear Lung Fields] : clear lung fields [Good Air Entry] : good air entry [No Respiratory Distress] : no respiratory distress  [Soft] : abdomen soft [Non Tender] : non-tender [No Masses/organomegaly] : no masses/organomegaly [Normal Bowel Sounds] : normal bowel sounds [Normal Gait] : normal gait [No Edema] : no edema [No Cyanosis] : no cyanosis [No Clubbing] : no clubbing [No Varicosities] : no varicosities [No Rash] : no rash [No Skin Lesions] : no skin lesions [Moves all extremities] : moves all extremities [No Focal Deficits] : no focal deficits [Normal Speech] : normal speech [Alert and Oriented] : alert and oriented [Normal memory] : normal memory [de-identified] : Chaperone: Ghazala Jackson MA

## 2023-08-28 NOTE — HISTORY OF PRESENT ILLNESS
[FreeTextEntry1] :    : Patient's daughter Elza in the room with the patient    HPI: Patient is a 78-year-old Maltese-speaking  female with a past medical history of hypertension, hyperlipidemia, diabetes mellitus.  Patient was seen by me in June 2023 for evaluation of chest pain.    Today, patient presents for follow-up cardiac evaluation .  Patient is feeling fine denies any exertional chest pain dyspnea palpitations.  No change in her medical history since last office visit.  Patient had an echocardiogram and a nuclear stress test.     PMH: Hypertension, diabetes, hyperlipidemia.  No prior CAD or CHF  Social History: Non-smoker denies any alcohol or substance abuse  Family history: Noncontributory    LUIS SILVA is a 79yo Female with PMH HTN, HLD, DM on orals who presents c/o left sided chest pain radiating to the left arm and left shoulder that came on at 10pm last night while she was sleeping. Pt states the pain is a/w cough, congestion, abdominal pain and diarrhea which all started yesterday. PT denies any symptoms of fevers, chills, nausea, vomiting

## 2023-10-12 ENCOUNTER — OFFICE (OUTPATIENT)
Dept: URBAN - METROPOLITAN AREA CLINIC 94 | Facility: CLINIC | Age: 78
Setting detail: OPHTHALMOLOGY
End: 2023-10-12
Payer: MEDICAID

## 2023-10-12 DIAGNOSIS — E11.3293: ICD-10-CM

## 2023-10-12 DIAGNOSIS — H35.3111: ICD-10-CM

## 2023-10-12 PROCEDURE — 92014 COMPRE OPH EXAM EST PT 1/>: CPT | Performed by: SPECIALIST

## 2023-10-12 PROCEDURE — 92134 CPTRZ OPH DX IMG PST SGM RTA: CPT | Performed by: SPECIALIST

## 2023-10-12 ASSESSMENT — PACHYMETRY
OS_CT_UM: 520
OS_CT_CORRECTION: 1
OD_CT_UM: 512
OD_CT_CORRECTION: 2

## 2023-10-12 ASSESSMENT — TONOMETRY
OD_IOP_MMHG: 15
OS_IOP_MMHG: 17

## 2023-10-12 ASSESSMENT — LID EXAM ASSESSMENTS: OS_TRICHIASIS: LLL 1+

## 2023-10-12 ASSESSMENT — CONFRONTATIONAL VISUAL FIELD TEST (CVF)
OS_FINDINGS: FULL
OD_FINDINGS: FULL

## 2023-10-13 ASSESSMENT — REFRACTION_AUTOREFRACTION
OD_SPHERE: +0.75
OD_CYLINDER: -1.25
OS_AXIS: 071
OD_AXIS: 092
OS_SPHERE: +0.75
OS_CYLINDER: -2.00

## 2023-10-13 ASSESSMENT — KERATOMETRY
OS_AXISANGLE_DEGREES: 157
OD_K2POWER_DIOPTERS: 44.50
OS_K1POWER_DIOPTERS: 43.75
OD_AXISANGLE_DEGREES: 005
OS_K2POWER_DIOPTERS: 45.00
OD_K1POWER_DIOPTERS: 43.50
METHOD_AUTO_MANUAL: AUTO

## 2023-10-13 ASSESSMENT — AXIALLENGTH_DERIVED
OD_AL: 23.3618
OS_AL: 23.37

## 2023-10-13 ASSESSMENT — VISUAL ACUITY
OS_BCVA: 20/40+
OD_BCVA: 20/30

## 2023-10-13 ASSESSMENT — SPHEQUIV_DERIVED
OD_SPHEQUIV: 0.125
OS_SPHEQUIV: -0.25

## 2023-11-30 ENCOUNTER — OFFICE (OUTPATIENT)
Dept: URBAN - METROPOLITAN AREA CLINIC 94 | Facility: CLINIC | Age: 78
Setting detail: OPHTHALMOLOGY
End: 2023-11-30
Payer: MEDICAID

## 2023-11-30 DIAGNOSIS — H40.1131: ICD-10-CM

## 2023-11-30 DIAGNOSIS — E11.3292: ICD-10-CM

## 2023-11-30 DIAGNOSIS — H35.3111: ICD-10-CM

## 2023-11-30 DIAGNOSIS — E11.3291: ICD-10-CM

## 2023-11-30 DIAGNOSIS — E11.3293: ICD-10-CM

## 2023-11-30 DIAGNOSIS — Z96.1: ICD-10-CM

## 2023-11-30 PROCEDURE — 99213 OFFICE O/P EST LOW 20 MIN: CPT | Performed by: OPHTHALMOLOGY

## 2023-11-30 PROCEDURE — 92133 CPTRZD OPH DX IMG PST SGM ON: CPT | Performed by: OPHTHALMOLOGY

## 2023-11-30 ASSESSMENT — LID EXAM ASSESSMENTS: OS_TRICHIASIS: LLL 1+

## 2023-11-30 ASSESSMENT — REFRACTION_AUTOREFRACTION
OD_SPHERE: +1.00
OD_CYLINDER: -1.50
OS_SPHERE: +0.75
OD_AXIS: 092
OS_CYLINDER: -2.00
OS_AXIS: 068

## 2023-11-30 ASSESSMENT — CONFRONTATIONAL VISUAL FIELD TEST (CVF)
OS_FINDINGS: FULL
OD_FINDINGS: FULL

## 2023-11-30 ASSESSMENT — SPHEQUIV_DERIVED
OD_SPHEQUIV: 0.25
OS_SPHEQUIV: -0.25

## 2024-01-15 ENCOUNTER — RX ONLY (RX ONLY)
Age: 79
End: 2024-01-15

## 2024-01-15 ENCOUNTER — OFFICE (OUTPATIENT)
Dept: URBAN - METROPOLITAN AREA CLINIC 94 | Facility: CLINIC | Age: 79
Setting detail: OPHTHALMOLOGY
End: 2024-01-15
Payer: MEDICAID

## 2024-01-15 DIAGNOSIS — E11.3291: ICD-10-CM

## 2024-01-15 DIAGNOSIS — E11.3292: ICD-10-CM

## 2024-01-15 DIAGNOSIS — H35.3111: ICD-10-CM

## 2024-01-15 DIAGNOSIS — Z96.1: ICD-10-CM

## 2024-01-15 DIAGNOSIS — E11.3293: ICD-10-CM

## 2024-01-15 DIAGNOSIS — H40.1131: ICD-10-CM

## 2024-01-15 PROCEDURE — 99213 OFFICE O/P EST LOW 20 MIN: CPT | Performed by: OPHTHALMOLOGY

## 2024-01-15 PROCEDURE — 92020 GONIOSCOPY: CPT | Performed by: OPHTHALMOLOGY

## 2024-01-15 ASSESSMENT — REFRACTION_MANIFEST
OD_SPHERE: +0.50
OS_SPHERE: +0.50
OD_ADD: +2.75
OS_CYLINDER: -1.25
OS_ADD: +2.75
OD_CYLINDER: -1.00
OD_AXIS: 95
OS_AXIS: 70

## 2024-01-15 ASSESSMENT — SPHEQUIV_DERIVED
OS_SPHEQUIV: -0.375
OD_SPHEQUIV: 0.25
OD_SPHEQUIV: 0
OS_SPHEQUIV: -0.125

## 2024-01-15 ASSESSMENT — REFRACTION_CURRENTRX
OD_SPHERE: +0.50
OD_OVR_VA: 20/
OS_OVR_VA: 20/
OS_ADD: +2.75
OD_ADD: +2.50
OS_AXIS: 070
OS_SPHERE: +0.50
OS_CYLINDER: -1.50
OD_CYLINDER: -1.00
OD_AXIS: 100

## 2024-01-15 ASSESSMENT — REFRACTION_AUTOREFRACTION
OS_SPHERE: +0.50
OD_SPHERE: +1.00
OS_AXIS: 067
OD_CYLINDER: -1.50
OD_AXIS: 093
OS_CYLINDER: -1.75

## 2024-01-15 ASSESSMENT — CONFRONTATIONAL VISUAL FIELD TEST (CVF)
OS_FINDINGS: FULL
OD_FINDINGS: FULL

## 2024-01-15 ASSESSMENT — LID EXAM ASSESSMENTS: OS_TRICHIASIS: LLL 1+

## 2024-02-14 NOTE — ED PROVIDER NOTE - ATTENDING CONTRIBUTION TO CARE
Patient wife calling for referral to [facility]. Order placed and sent to MD for co-sign   atypical left sided chest discomfort; well appearing, NAD, no jvd; normal heart and lung sounds; no edema; no rash; abd soft nt nd; labs ecg and cxr reviewed; agree with resident plan of care    I personally saw the patient with the resident, and completed the key components of the history and physical exam. I then discussed the management plan with the resident.

## 2024-04-18 ENCOUNTER — OFFICE (OUTPATIENT)
Dept: URBAN - METROPOLITAN AREA CLINIC 94 | Facility: CLINIC | Age: 79
Setting detail: OPHTHALMOLOGY
End: 2024-04-18
Payer: MEDICARE

## 2024-04-18 DIAGNOSIS — E11.3293: ICD-10-CM

## 2024-04-18 DIAGNOSIS — H35.3111: ICD-10-CM

## 2024-04-18 PROCEDURE — 92014 COMPRE OPH EXAM EST PT 1/>: CPT | Performed by: SPECIALIST

## 2024-04-18 PROCEDURE — 92235 FLUORESCEIN ANGRPH MLTIFRAME: CPT | Performed by: SPECIALIST

## 2024-04-18 PROCEDURE — 92134 CPTRZ OPH DX IMG PST SGM RTA: CPT | Performed by: SPECIALIST

## 2024-04-18 ASSESSMENT — LID EXAM ASSESSMENTS: OS_TRICHIASIS: LLL 1+

## 2024-05-16 ENCOUNTER — OFFICE (OUTPATIENT)
Dept: URBAN - METROPOLITAN AREA CLINIC 94 | Facility: CLINIC | Age: 79
Setting detail: OPHTHALMOLOGY
End: 2024-05-16
Payer: MEDICARE

## 2024-05-16 DIAGNOSIS — E11.3292: ICD-10-CM

## 2024-05-16 DIAGNOSIS — E11.3293: ICD-10-CM

## 2024-05-16 DIAGNOSIS — H35.3111: ICD-10-CM

## 2024-05-16 DIAGNOSIS — E11.3291: ICD-10-CM

## 2024-05-16 DIAGNOSIS — H16.223: ICD-10-CM

## 2024-05-16 DIAGNOSIS — H40.1131: ICD-10-CM

## 2024-05-16 PROCEDURE — 99213 OFFICE O/P EST LOW 20 MIN: CPT | Performed by: OPHTHALMOLOGY

## 2024-05-16 PROCEDURE — 92083 EXTENDED VISUAL FIELD XM: CPT | Performed by: OPHTHALMOLOGY

## 2024-05-16 ASSESSMENT — LID EXAM ASSESSMENTS: OS_TRICHIASIS: LLL 1+

## 2024-05-16 ASSESSMENT — CONFRONTATIONAL VISUAL FIELD TEST (CVF)
OS_FINDINGS: FULL
OD_FINDINGS: FULL

## 2024-08-22 ENCOUNTER — APPOINTMENT (OUTPATIENT)
Dept: CARDIOLOGY | Facility: CLINIC | Age: 79
End: 2024-08-22
Payer: MEDICARE

## 2024-08-22 ENCOUNTER — NON-APPOINTMENT (OUTPATIENT)
Age: 79
End: 2024-08-22

## 2024-08-22 VITALS — WEIGHT: 115 LBS | BODY MASS INDEX: 22.58 KG/M2 | HEIGHT: 60 IN

## 2024-08-22 VITALS — HEART RATE: 66 BPM | OXYGEN SATURATION: 100 % | DIASTOLIC BLOOD PRESSURE: 66 MMHG | SYSTOLIC BLOOD PRESSURE: 119 MMHG

## 2024-08-22 DIAGNOSIS — I34.0 NONRHEUMATIC MITRAL (VALVE) INSUFFICIENCY: ICD-10-CM

## 2024-08-22 PROCEDURE — 99213 OFFICE O/P EST LOW 20 MIN: CPT | Mod: 25

## 2024-08-22 PROCEDURE — 93000 ELECTROCARDIOGRAM COMPLETE: CPT

## 2024-08-22 RX ORDER — LATANOPROST/PF 0.005 %
0.01 DROPS OPHTHALMIC (EYE) DAILY
Refills: 0 | Status: ACTIVE | COMMUNITY

## 2024-08-22 NOTE — ASSESSMENT
[FreeTextEntry1] : EKG: June 9, 2023: Normal sinus rhythm, RSR in V1. Nuclear stress test July 7, 2023: Normal myocardial perfusion Echocardiogram July 28, 2023: LVEF 70 to 75%.  Mild to moderate mitral regurgitation.  Moderate tricuspid regurgitation.  Mild aortic regurgitation.  Normal RV size and function.  EKG 8/22/2023- Sinus Rhythm  Low voltage in precordial leads. -Incomplete right bundle branch block. ABNORMAL  Assessment: 1.  Chest pressure- Resolved. 2.  Diabetes mellitus 3.  Hypertension 4.  Hyperlipidemia 5.  Valvular heart disease: Mild to moderate mitral regurgitation, mild aortic regurgitation, moderate tricuspid regurgitation  Recommendations: Test results discussed with the patient and her daughter.  1.  Follow-up in 6 months

## 2024-08-22 NOTE — HISTORY OF PRESENT ILLNESS
[FreeTextEntry1] :   : Patient's daughter Elza in the room with the patient    HPI: Patient is a 79-year-old Occitan-speaking  female with a past medical history of hypertension, hyperlipidemia, diabetes mellitus.  Patient was seen by me in June 2023 for evaluation of chest pain.    Today, patient presents for follow-up cardiac evaluation .  Patient is feeling fine denies any exertional chest pain dyspnea palpitations.  No change in her medical history since last office visit.      PMH: Hypertension, diabetes, hyperlipidemia.  No prior CAD or CHF  Social History: Non-smoker denies any alcohol or substance abuse  Family history: Noncontributory

## 2024-08-22 NOTE — PHYSICAL EXAM
[Well Nourished] : well nourished [No Acute Distress] : no acute distress [Frail] : frail [Normal Conjunctiva] : normal conjunctiva [Normal Venous Pressure] : normal venous pressure [No Carotid Bruit] : no carotid bruit [Normal S1, S2] : normal S1, S2 [No Murmur] : no murmur [No Rub] : no rub [No Gallop] : no gallop [Clear Lung Fields] : clear lung fields [Good Air Entry] : good air entry [No Respiratory Distress] : no respiratory distress  [Soft] : abdomen soft [Non Tender] : non-tender [No Masses/organomegaly] : no masses/organomegaly [Normal Bowel Sounds] : normal bowel sounds [Normal Gait] : normal gait [No Edema] : no edema [No Cyanosis] : no cyanosis [No Clubbing] : no clubbing [No Varicosities] : no varicosities [No Rash] : no rash [No Skin Lesions] : no skin lesions [Moves all extremities] : moves all extremities [No Focal Deficits] : no focal deficits [Normal Speech] : normal speech [Alert and Oriented] : alert and oriented [Normal memory] : normal memory

## 2024-09-19 ENCOUNTER — OFFICE (OUTPATIENT)
Dept: URBAN - METROPOLITAN AREA CLINIC 94 | Facility: CLINIC | Age: 79
Setting detail: OPHTHALMOLOGY
End: 2024-09-19
Payer: MEDICARE

## 2024-09-19 DIAGNOSIS — H16.223: ICD-10-CM

## 2024-09-19 DIAGNOSIS — E11.3293: ICD-10-CM

## 2024-09-19 DIAGNOSIS — H40.1131: ICD-10-CM

## 2024-09-19 DIAGNOSIS — E11.3291: ICD-10-CM

## 2024-09-19 DIAGNOSIS — E11.3292: ICD-10-CM

## 2024-09-19 DIAGNOSIS — H35.3111: ICD-10-CM

## 2024-09-19 PROCEDURE — 99213 OFFICE O/P EST LOW 20 MIN: CPT | Performed by: OPHTHALMOLOGY

## 2024-09-19 PROCEDURE — 92133 CPTRZD OPH DX IMG PST SGM ON: CPT | Performed by: OPHTHALMOLOGY

## 2024-09-19 ASSESSMENT — CONFRONTATIONAL VISUAL FIELD TEST (CVF)
OS_FINDINGS: FULL
OD_FINDINGS: FULL

## 2024-09-19 ASSESSMENT — LID EXAM ASSESSMENTS: OS_TRICHIASIS: LLL 1+

## 2024-10-17 ENCOUNTER — OFFICE (OUTPATIENT)
Dept: URBAN - METROPOLITAN AREA CLINIC 94 | Facility: CLINIC | Age: 79
Setting detail: OPHTHALMOLOGY
End: 2024-10-17
Payer: MEDICARE

## 2024-10-17 DIAGNOSIS — H35.3111: ICD-10-CM

## 2024-10-17 DIAGNOSIS — E11.3293: ICD-10-CM

## 2024-10-17 PROCEDURE — 92235 FLUORESCEIN ANGRPH MLTIFRAME: CPT | Performed by: SPECIALIST

## 2024-10-17 PROCEDURE — 92134 CPTRZ OPH DX IMG PST SGM RTA: CPT | Performed by: SPECIALIST

## 2024-10-17 PROCEDURE — 92014 COMPRE OPH EXAM EST PT 1/>: CPT | Performed by: SPECIALIST

## 2024-10-17 ASSESSMENT — LID EXAM ASSESSMENTS: OS_TRICHIASIS: LLL 1+

## 2024-10-17 ASSESSMENT — REFRACTION_AUTOREFRACTION
OD_CYLINDER: -1.75
OS_AXIS: 067
OS_SPHERE: +1.25
OD_SPHERE: +1.25
OD_AXIS: 090
OS_CYLINDER: -2.50

## 2024-10-17 ASSESSMENT — CONFRONTATIONAL VISUAL FIELD TEST (CVF)
OS_FINDINGS: FULL
OD_FINDINGS: FULL

## 2024-10-17 ASSESSMENT — KERATOMETRY
OD_K1POWER_DIOPTERS: 43.50
OS_K1POWER_DIOPTERS: 43.50
OD_AXISANGLE_DEGREES: 179
OD_K2POWER_DIOPTERS: 44.25
OS_AXISANGLE_DEGREES: 154
OS_K2POWER_DIOPTERS: 45.00
METHOD_AUTO_MANUAL: AUTO

## 2024-10-17 ASSESSMENT — PACHYMETRY
OS_CT_UM: 520
OS_CT_CORRECTION: 1
OD_CT_CORRECTION: 2
OD_CT_UM: 512

## 2024-10-17 ASSESSMENT — VISUAL ACUITY
OD_BCVA: 20/30
OS_BCVA: 20/30

## 2024-10-17 ASSESSMENT — TONOMETRY
OD_IOP_MMHG: 13
OS_IOP_MMHG: 14

## 2025-01-30 ENCOUNTER — OFFICE (OUTPATIENT)
Dept: URBAN - METROPOLITAN AREA CLINIC 94 | Facility: CLINIC | Age: 80
Setting detail: OPHTHALMOLOGY
End: 2025-01-30
Payer: MEDICARE

## 2025-01-30 DIAGNOSIS — H35.3111: ICD-10-CM

## 2025-01-30 DIAGNOSIS — H40.1131: ICD-10-CM

## 2025-01-30 DIAGNOSIS — E11.3293: ICD-10-CM

## 2025-01-30 DIAGNOSIS — E11.3291: ICD-10-CM

## 2025-01-30 DIAGNOSIS — E11.3292: ICD-10-CM

## 2025-01-30 DIAGNOSIS — Z96.1: ICD-10-CM

## 2025-01-30 PROCEDURE — 99213 OFFICE O/P EST LOW 20 MIN: CPT | Performed by: OPHTHALMOLOGY

## 2025-01-30 ASSESSMENT — KERATOMETRY
OD_K1POWER_DIOPTERS: 43.50
OS_AXISANGLE_DEGREES: 150
OD_K2POWER_DIOPTERS: 44.50
OS_K2POWER_DIOPTERS: 45.00
OD_AXISANGLE_DEGREES: 180
OS_K1POWER_DIOPTERS: 43.75
METHOD_AUTO_MANUAL: AUTO

## 2025-01-30 ASSESSMENT — TONOMETRY
OS_IOP_MMHG: 16
OS_IOP_MMHG: 17
OD_IOP_MMHG: 15
OD_IOP_MMHG: 16

## 2025-01-30 ASSESSMENT — PACHYMETRY
OS_CT_UM: 520
OD_CT_UM: 512
OS_CT_CORRECTION: 1
OD_CT_CORRECTION: 2

## 2025-01-30 ASSESSMENT — CONFRONTATIONAL VISUAL FIELD TEST (CVF)
OS_FINDINGS: FULL
OD_FINDINGS: FULL

## 2025-01-30 ASSESSMENT — REFRACTION_AUTOREFRACTION
OD_SPHERE: +0.75
OD_CYLINDER: -1.50
OS_SPHERE: +1.00
OS_AXIS: 068
OS_CYLINDER: -2.25
OD_AXIS: 092

## 2025-01-30 ASSESSMENT — REFRACTION_MANIFEST
OS_CYLINDER: -1.50
OS_SPHERE: +0.75
OD_SPHERE: +0.50
OS_ADD: +2.75
OS_AXIS: 70
OD_AXIS: 95
OD_ADD: +2.75
OD_CYLINDER: -1.00

## 2025-01-30 ASSESSMENT — LID EXAM ASSESSMENTS: OS_TRICHIASIS: LLL 1+

## 2025-01-30 ASSESSMENT — VISUAL ACUITY
OS_BCVA: 20/25-
OD_BCVA: 20/30-

## 2025-01-30 NOTE — ED ADULT NURSE NOTE - NS TRANSFER DISPOSITION PATIENT BELONGINGS
Personalized Prevention Plan was reviewed and given to pt.   Medicare Wellness Visit  Plan for Preventive Care    A good way for you to stay healthy is to use preventive care.  Medicare covers many services that can help you stay healthy.* The goal of these services is to find any health problems as quickly as possible. Finding problems early can help make them easier to treat.  Your personal plan below lists the services you may need and when they are due.      Health Maintenance Summary       Microalbumin Ratio (Yearly)  Never done    Vitamin D (Yearly)  Never done    Respiratory Syncytial Virus (RSV) Vaccine 60+ (1 - 1-dose 75+ series)  Never done    Shingles Vaccine (1 of 2)  Overdue since 2/2/2016    COVID-19 Vaccine (5 - 2024-25 season)  Overdue since 9/1/2024    Medicare Advantage- Medicare Wellness Visit (Yearly - January to December)  Due since 1/1/2025    Influenza Vaccine (1)  Postponed until 6/30/2025    Hemoglobin (Every 6 Months)  Next due on 7/18/2025    GFR (Every 6 Months)  Next due on 7/19/2025    Phosphorus (Every 6 Months)  Next due on 7/19/2025    Parathyroid Hormone (Yearly)  Next due on 7/23/2025    Depression Screening (Yearly)  Next due on 1/30/2026    DTaP/Tdap/Td Vaccine (4 - Td or Tdap)  Next due on 3/21/2032    Pneumococcal Vaccine 50+   Completed    Hepatitis A Vaccine   Aged Out    Meningococcal Vaccine   Aged Out    Hepatitis B Vaccine (For Physician/APC Discussion)   Aged Out    Meningococcal Serogroup B Vaccine   Aged Out    HPV Vaccine   Aged Out             Preventive Care for Women and Men    Heart Screenings (Cardiovascular):  Blood tests are used to check your cholesterol, lipid and triglyceride levels. High levels can increase your risk for heart disease and stroke. High levels can be treated with medications, diet and exercise. Lowering your levels can help keep your heart and blood vessels healthy.  Your provider will order these tests if they are needed.    An ultrasound is  done to see if you have an abdominal aortic aneurysm (AAA).  This is an enlargement of one of the main blood vessels that delivers blood to the body.   In the [place], 9,000 deaths are caused by AAA.  You may not even know you have this problem and as many as 1 in 3 people will have a serious problem if it is not treated.  Early diagnosis allows for more effective treatment and cure.  If you have a family history of AAA or are a male age 65-75 who has smoked, you are at higher risk of an AAA.  Your provider can order this test, if needed.    Colorectal Screening:  There are many tests that are used to check for cancer of your colon and rectum. You and your provider should discuss what test is best for you and when to have it done.  Options include:  Screening Colonoscopy: exam of the entire colon, seen through a flexible lighted tube.  Flexible Sigmoidoscopy: exam of the last third (sigmoid portion) of the colon and rectum, seen through a flexible lighted tube.  Cologuard DNA stool test: a sample of your stool is used to screen for cancer and unseen blood in your stool.  Fecal Occult Blood Test: a sample of your stool is studied to find any unseen blood    Flu Shot:  An immunization that helps to prevent influenza (the flu). You should get this every year. The best time to get the shot is in the fall.    Pneumococcal Shot:  Vaccines help prevent pneumococcal disease, which is any type of illness caused by Streptococcus pneumoniae bacteria. There are two kinds of pneumococcal vaccines available in the [place]:   Pneumococcal conjugate vaccines (PCV20 or [id number]®)  Pneumococcal polysaccharide vaccine (PPSV23 or [id number]®)  For those who have never received any pneumococcal conjugate vaccine, CDC recommends PVC20 for adults 65 years or older and adults 19 through 64 years old with certain medical conditions or risk factors.   For those who have previously received PCV13, this should be followed by a  dose of PPSV23.     Hepatitis B Shot:  An immunization that helps to protect people from getting Hepatitis B. Hepatitis B is a virus that spreads through contact with infected blood or body fluids. Many people with the virus do not have symptoms.  The virus can lead to serious problems, such as liver disease. Some people are at higher risk than others. Your doctor will tell you if you need this shot.     Diabetes Screening:  A test to measure sugar (glucose) in your blood is called a fasting blood sugar. Fasting means you cannot have food or drink for at least 8 hours before the test. This test can detect diabetes long before you may notice symptoms.    Glaucoma Screening:  Glaucoma screening is performed by your eye doctor. The test measures the fluid pressure inside your eyes to determine if you have glaucoma.     Hepatitis C Screening:  A blood test to see if you have the hepatitis C virus.  Hepatitis C attacks the liver and is a major cause of chronic liver disease.  Medicare will cover a single screening for all adults born between 1945 & 1965, or high risk patients (people who have injected illegal drugs or people who have had blood transfusions).  High risk patients who continue to inject illegal drugs can be screened for Hepatitis C every year.    Smoking and Tobacco-Use Cessation Counseling:  Tobacco is the single greatest cause of disease and early death in our country today. Medication and counseling together can increase a person’s chance of quitting for good.   Medicare covers two quitting attempts per year, with four counseling sessions per attempt (eight sessions in a 12 month period)    Preventive Screening tests for Women    Screening Mammograms and Breast Exams:  An x-ray of your breasts to check for breast cancer before you or your doctor may be able to feel it.  If breast cancer is found early it can usually be treated with success.    Pelvic Exams and Pap Tests:  An exam to check for cervical  and vaginal cancer. A Pap test is a lab test in which cells are taken from your cervix and sent to the lab to look for signs of cervical cancer. If cancer of the cervix is found early, chances for a cure are good. Testing can generally end at age 65, or if a woman has a hysterectomy for a benign condition. Your provider may recommend more frequent testing if certain abnormal results are found.    Bone Mass Measurements:  A painless x-ray of your bone density to see if you are at risk for a broken bone. Bone density refers to the thickness of bones or how tightly the bone tissue is packed.    Preventive Screening tests for Men    Prostate Screening:  Should you have a prostate cancer test (PSA)?  It is up to you to decide if you want a prostate cancer test. Talk to your clinician to find out if the test is right for you.  Things for you to consider and talk about should include:  Benefits and harms of the test  Your family history  How your race/ethnicity may influence the test  If the test may impact other medical conditions you have  Your values on screenings and treatments    *Medicare pays for many preventive services to keep you healthy. For some of these services, you might have to pay a deductible, coinsurance, and / or copayment.  The amounts vary depending on the type of services you need and the kind of Medicare health plan you have.    For further details on screenings offered by Medicare please visit: https://www.medicare.gov/coverage/preventive-screening-services      with patient

## 2025-02-24 ENCOUNTER — APPOINTMENT (OUTPATIENT)
Dept: CARDIOLOGY | Facility: CLINIC | Age: 80
End: 2025-02-24
Payer: MEDICARE

## 2025-02-24 ENCOUNTER — NON-APPOINTMENT (OUTPATIENT)
Age: 80
End: 2025-02-24

## 2025-02-24 VITALS
OXYGEN SATURATION: 95 % | DIASTOLIC BLOOD PRESSURE: 66 MMHG | HEART RATE: 85 BPM | HEIGHT: 60 IN | SYSTOLIC BLOOD PRESSURE: 130 MMHG | BODY MASS INDEX: 22.97 KG/M2 | WEIGHT: 117 LBS

## 2025-02-24 DIAGNOSIS — R07.89 OTHER CHEST PAIN: ICD-10-CM

## 2025-02-24 PROCEDURE — 99214 OFFICE O/P EST MOD 30 MIN: CPT | Mod: 25

## 2025-02-24 PROCEDURE — 93000 ELECTROCARDIOGRAM COMPLETE: CPT

## 2025-03-18 ENCOUNTER — APPOINTMENT (OUTPATIENT)
Dept: CARDIOLOGY | Facility: CLINIC | Age: 80
End: 2025-03-18
Payer: MEDICARE

## 2025-03-18 PROCEDURE — 93306 TTE W/DOPPLER COMPLETE: CPT

## 2025-03-31 ENCOUNTER — APPOINTMENT (OUTPATIENT)
Dept: CT IMAGING | Facility: CLINIC | Age: 80
End: 2025-03-31
Payer: MEDICARE

## 2025-03-31 ENCOUNTER — OUTPATIENT (OUTPATIENT)
Dept: OUTPATIENT SERVICES | Facility: HOSPITAL | Age: 80
LOS: 1 days | End: 2025-03-31
Payer: MEDICARE

## 2025-03-31 DIAGNOSIS — R07.89 OTHER CHEST PAIN: ICD-10-CM

## 2025-03-31 PROCEDURE — 75571 CT HRT W/O DYE W/CA TEST: CPT

## 2025-03-31 PROCEDURE — 75571 CT HRT W/O DYE W/CA TEST: CPT | Mod: 26

## 2025-04-17 ENCOUNTER — OFFICE (OUTPATIENT)
Dept: URBAN - METROPOLITAN AREA CLINIC 94 | Facility: CLINIC | Age: 80
Setting detail: OPHTHALMOLOGY
End: 2025-04-17
Payer: MEDICARE

## 2025-04-17 DIAGNOSIS — E11.3293: ICD-10-CM

## 2025-04-17 DIAGNOSIS — H35.3111: ICD-10-CM

## 2025-04-17 PROCEDURE — 92014 COMPRE OPH EXAM EST PT 1/>: CPT | Performed by: SPECIALIST

## 2025-04-17 PROCEDURE — 92235 FLUORESCEIN ANGRPH MLTIFRAME: CPT | Performed by: SPECIALIST

## 2025-04-17 PROCEDURE — 92134 CPTRZ OPH DX IMG PST SGM RTA: CPT | Performed by: SPECIALIST

## 2025-04-17 ASSESSMENT — TONOMETRY
OD_IOP_MMHG: 14
OS_IOP_MMHG: 13

## 2025-04-17 ASSESSMENT — KERATOMETRY
OS_K1POWER_DIOPTERS: 43.75
OD_AXISANGLE_DEGREES: 180
OD_K2POWER_DIOPTERS: 44.50
OS_AXISANGLE_DEGREES: 150
METHOD_AUTO_MANUAL: AUTO
OS_K2POWER_DIOPTERS: 45.00
OD_K1POWER_DIOPTERS: 43.50

## 2025-04-17 ASSESSMENT — CONFRONTATIONAL VISUAL FIELD TEST (CVF)
OS_FINDINGS: FULL
OD_FINDINGS: FULL

## 2025-04-17 ASSESSMENT — PACHYMETRY
OD_CT_UM: 512
OD_CT_CORRECTION: 2
OS_CT_CORRECTION: 1
OS_CT_UM: 520

## 2025-04-17 ASSESSMENT — REFRACTION_AUTOREFRACTION
OD_CYLINDER: -1.50
OS_CYLINDER: -2.25
OS_SPHERE: +1.00
OD_AXIS: 092
OS_AXIS: 068
OD_SPHERE: +0.75

## 2025-04-17 ASSESSMENT — VISUAL ACUITY
OD_BCVA: 20/25
OS_BCVA: 20/30-1

## 2025-04-17 ASSESSMENT — LID EXAM ASSESSMENTS: OS_TRICHIASIS: LLL 1+

## 2025-04-23 ENCOUNTER — APPOINTMENT (OUTPATIENT)
Dept: CARDIOLOGY | Facility: CLINIC | Age: 80
End: 2025-04-23
Payer: MEDICARE

## 2025-04-23 PROCEDURE — 93015 CV STRESS TEST SUPVJ I&R: CPT

## 2025-05-16 ENCOUNTER — NON-APPOINTMENT (OUTPATIENT)
Age: 80
End: 2025-05-16

## 2025-05-16 ENCOUNTER — APPOINTMENT (OUTPATIENT)
Dept: CARDIOLOGY | Facility: CLINIC | Age: 80
End: 2025-05-16
Payer: MEDICARE

## 2025-05-16 VITALS
BODY MASS INDEX: 22.38 KG/M2 | HEART RATE: 62 BPM | OXYGEN SATURATION: 97 % | WEIGHT: 114 LBS | DIASTOLIC BLOOD PRESSURE: 70 MMHG | SYSTOLIC BLOOD PRESSURE: 110 MMHG | HEIGHT: 60 IN

## 2025-05-16 DIAGNOSIS — R07.89 OTHER CHEST PAIN: ICD-10-CM

## 2025-05-16 PROCEDURE — 99213 OFFICE O/P EST LOW 20 MIN: CPT | Mod: 25

## 2025-05-16 PROCEDURE — 93000 ELECTROCARDIOGRAM COMPLETE: CPT

## 2025-05-29 ENCOUNTER — OFFICE (OUTPATIENT)
Dept: URBAN - METROPOLITAN AREA CLINIC 94 | Facility: CLINIC | Age: 80
Setting detail: OPHTHALMOLOGY
End: 2025-05-29
Payer: MEDICARE

## 2025-05-29 DIAGNOSIS — Z96.1: ICD-10-CM

## 2025-05-29 DIAGNOSIS — H35.3111: ICD-10-CM

## 2025-05-29 DIAGNOSIS — H40.1131: ICD-10-CM

## 2025-05-29 DIAGNOSIS — E11.3293: ICD-10-CM

## 2025-05-29 PROCEDURE — 99213 OFFICE O/P EST LOW 20 MIN: CPT | Performed by: OPHTHALMOLOGY

## 2025-05-29 PROCEDURE — 92250 FUNDUS PHOTOGRAPHY W/I&R: CPT | Performed by: OPHTHALMOLOGY

## 2025-05-29 PROCEDURE — 92083 EXTENDED VISUAL FIELD XM: CPT | Performed by: OPHTHALMOLOGY

## 2025-05-29 ASSESSMENT — REFRACTION_CURRENTRX
OS_AXIS: 074
OS_OVR_VA: 20/
OD_CYLINDER: -1.00
OS_VPRISM_DIRECTION: PROGS
OD_ADD: +2.50
OS_SPHERE: +0.50
OD_VPRISM_DIRECTION: PROGS
OD_SPHERE: +0.50
OD_AXIS: 102
OS_ADD: +2.50
OD_OVR_VA: 20/
OS_CYLINDER: -1.25

## 2025-05-29 ASSESSMENT — CONFRONTATIONAL VISUAL FIELD TEST (CVF)
OS_FINDINGS: FULL
OD_FINDINGS: FULL

## 2025-05-29 ASSESSMENT — KERATOMETRY
OD_K1POWER_DIOPTERS: 43.75
OS_AXISANGLE_DEGREES: 161
OD_AXISANGLE_DEGREES: 002
OS_K1POWER_DIOPTERS: 43.50
OD_K2POWER_DIOPTERS: 44.75
METHOD_AUTO_MANUAL: AUTO
OS_K2POWER_DIOPTERS: 45.25

## 2025-05-29 ASSESSMENT — PACHYMETRY
OD_CT_UM: 512
OD_CT_CORRECTION: 2
OS_CT_UM: 520
OS_CT_CORRECTION: 1

## 2025-05-29 ASSESSMENT — REFRACTION_AUTOREFRACTION
OD_AXIS: 094
OS_AXIS: 071
OS_CYLINDER: -2.25
OD_CYLINDER: -1.50
OD_SPHERE: +0.75
OS_SPHERE: +1.25

## 2025-05-29 ASSESSMENT — TONOMETRY
OD_IOP_MMHG: 17
OS_IOP_MMHG: 18
OD_IOP_MMHG: 14
OS_IOP_MMHG: 17

## 2025-05-29 ASSESSMENT — LID EXAM ASSESSMENTS: OS_TRICHIASIS: LLL 1+

## 2025-05-29 ASSESSMENT — VISUAL ACUITY
OS_BCVA: 20/40
OD_BCVA: 20/30

## 2025-06-25 ENCOUNTER — TRANSCRIPTION ENCOUNTER (OUTPATIENT)
Age: 80
End: 2025-06-25